# Patient Record
Sex: MALE | Race: WHITE
[De-identification: names, ages, dates, MRNs, and addresses within clinical notes are randomized per-mention and may not be internally consistent; named-entity substitution may affect disease eponyms.]

---

## 2018-03-01 ENCOUNTER — HOSPITAL ENCOUNTER (EMERGENCY)
Dept: HOSPITAL 63 - ER | Age: 63
Discharge: HOME | End: 2018-03-01
Payer: MEDICARE

## 2018-03-01 VITALS — DIASTOLIC BLOOD PRESSURE: 73 MMHG | SYSTOLIC BLOOD PRESSURE: 134 MMHG

## 2018-03-01 VITALS — BODY MASS INDEX: 17.9 KG/M2 | WEIGHT: 147 LBS | HEIGHT: 76 IN

## 2018-03-01 DIAGNOSIS — J18.1: Primary | ICD-10-CM

## 2018-03-01 DIAGNOSIS — Z91.19: ICD-10-CM

## 2018-03-01 DIAGNOSIS — Z99.81: ICD-10-CM

## 2018-03-01 DIAGNOSIS — J44.9: ICD-10-CM

## 2018-03-01 DIAGNOSIS — F17.210: ICD-10-CM

## 2018-03-01 DIAGNOSIS — I50.9: ICD-10-CM

## 2018-03-01 LAB
ALBUMIN SERPL-MCNC: 3.1 G/DL (ref 3.4–5)
ALBUMIN/GLOB SERPL: 0.9 {RATIO} (ref 1–1.7)
ALP SERPL-CCNC: 68 U/L (ref 46–116)
ALT SERPL-CCNC: 22 U/L (ref 16–63)
AMPHETAMINE/METHAMPHETAMINE: (no result)
ANION GAP SERPL CALC-SCNC: 3 MMOL/L (ref 6–14)
APTT PPP: YELLOW S
AST SERPL-CCNC: 12 U/L (ref 15–37)
BACTERIA #/AREA URNS HPF: 0 /HPF
BARBITURATES UR-MCNC: (no result) UG/ML
BASOPHILS # BLD AUTO: 0 X10^3/UL (ref 0–0.2)
BASOPHILS NFR BLD: 0 % (ref 0–3)
BENZODIAZ UR-MCNC: (no result) UG/L
BGAS PCO2: 43 MMHG (ref 35–46)
BGAS PH: 7.45 (ref 7.35–7.46)
BGAS PO2: 95 MMHG (ref 80–100)
BILIRUB SERPL-MCNC: 0.5 MG/DL (ref 0.2–1)
BILIRUB UR QL STRIP: (no result)
BUN/CREAT SERPL: 8 (ref 6–20)
CA-I SERPL ISE-MCNC: 6 MG/DL (ref 8–26)
CALCIUM SERPL-MCNC: 8.9 MG/DL (ref 8.5–10.1)
CANNABINOIDS UR-MCNC: (no result) UG/L
CHLORIDE SERPL-SCNC: 100 MMOL/L (ref 98–107)
CO2 SERPL-SCNC: 36 MMOL/L (ref 21–32)
COCAINE UR-MCNC: (no result) NG/ML
CREAT SERPL-MCNC: 0.8 MG/DL (ref 0.7–1.3)
DELTA BASE BGAS: 6 MMOL/L (ref 0–3)
EOSINOPHIL NFR BLD: 0.1 X10^3/UL (ref 0–0.7)
EOSINOPHIL NFR BLD: 3 % (ref 0–3)
ERYTHROCYTE [DISTWIDTH] IN BLOOD BY AUTOMATED COUNT: 13.6 % (ref 11.5–14.5)
FIBRINOGEN PPP-MCNC: CLEAR MG/DL
GFR SERPLBLD BASED ON 1.73 SQ M-ARVRAT: 97.3 ML/MIN
GLOBULIN SER-MCNC: 3.6 G/DL (ref 2.2–3.8)
GLUCOSE SERPL-MCNC: 111 MG/DL (ref 70–99)
GLUCOSE UR STRIP-MCNC: (no result) MG/DL
HCT VFR BLD CALC: 41.7 % (ref 39–53)
HGB BLD-MCNC: 14.1 G/DL (ref 13–17.5)
INFLUENZA A PATIENT: NEGATIVE
INFLUENZA B PATIENT: NEGATIVE
LIPASE: 44 U/L (ref 73–393)
LYMPHOCYTES # BLD: 1.2 X10^3/UL (ref 1–4.8)
LYMPHOCYTES NFR BLD AUTO: 27 % (ref 24–48)
MCH RBC QN AUTO: 32 PG (ref 25–35)
MCHC RBC AUTO-ENTMCNC: 34 G/DL (ref 31–37)
MCV RBC AUTO: 95 FL (ref 79–100)
METHADONE SERPL-MCNC: (no result) NG/ML
MONO #: 0.5 X10^3/UL (ref 0–1.1)
MONOCYTES NFR BLD: 11 % (ref 0–9)
NEUT #: 2.7 X10^3UL (ref 1.8–7.7)
NEUTROPHILS NFR BLD AUTO: 59 % (ref 31–73)
NITRITE UR QL STRIP: (no result)
O2 SAT BGAS: 98 % (ref 92–99)
O2/TOTAL GAS SETTING VFR VENT: 24 %
OPIATES UR-MCNC: (no result) NG/ML
PCP SERPL-MCNC: (no result) MG/DL
PLATELET # BLD AUTO: 315 X10^3/UL (ref 140–400)
POTASSIUM SERPL-SCNC: 4.2 MMOL/L (ref 3.5–5.1)
PROT SERPL-MCNC: 6.7 G/DL (ref 6.4–8.2)
RBC # BLD AUTO: 4.38 X10^6/UL (ref 4.3–5.7)
RBC #/AREA URNS HPF: 0 /HPF (ref 0–2)
SODIUM SERPL-SCNC: 139 MMOL/L (ref 136–145)
SP GR UR STRIP: 1.02
SQUAMOUS #/AREA URNS LPF: (no result) /LPF
UROBILINOGEN UR-MCNC: 1 MG/DL
WBC # BLD AUTO: 4.6 X10^3/UL (ref 4–11)
WBC #/AREA URNS HPF: (no result) /HPF (ref 0–4)

## 2018-03-01 PROCEDURE — 83605 ASSAY OF LACTIC ACID: CPT

## 2018-03-01 PROCEDURE — 87804 INFLUENZA ASSAY W/OPTIC: CPT

## 2018-03-01 PROCEDURE — 85025 COMPLETE CBC W/AUTO DIFF WBC: CPT

## 2018-03-01 PROCEDURE — 82803 BLOOD GASES ANY COMBINATION: CPT

## 2018-03-01 PROCEDURE — 80307 DRUG TEST PRSMV CHEM ANLYZR: CPT

## 2018-03-01 PROCEDURE — 84484 ASSAY OF TROPONIN QUANT: CPT

## 2018-03-01 PROCEDURE — 87040 BLOOD CULTURE FOR BACTERIA: CPT

## 2018-03-01 PROCEDURE — 36415 COLL VENOUS BLD VENIPUNCTURE: CPT

## 2018-03-01 PROCEDURE — 83690 ASSAY OF LIPASE: CPT

## 2018-03-01 PROCEDURE — 36600 WITHDRAWAL OF ARTERIAL BLOOD: CPT

## 2018-03-01 PROCEDURE — 81001 URINALYSIS AUTO W/SCOPE: CPT

## 2018-03-01 PROCEDURE — G0479 DRUG TEST PRESUMP NOT OPT: HCPCS

## 2018-03-01 PROCEDURE — 96365 THER/PROPH/DIAG IV INF INIT: CPT

## 2018-03-01 PROCEDURE — 93005 ELECTROCARDIOGRAM TRACING: CPT

## 2018-03-01 PROCEDURE — 99285 EMERGENCY DEPT VISIT HI MDM: CPT

## 2018-03-01 PROCEDURE — 94640 AIRWAY INHALATION TREATMENT: CPT

## 2018-03-01 PROCEDURE — 96375 TX/PRO/DX INJ NEW DRUG ADDON: CPT

## 2018-03-01 PROCEDURE — 80053 COMPREHEN METABOLIC PANEL: CPT

## 2018-03-01 PROCEDURE — 83880 ASSAY OF NATRIURETIC PEPTIDE: CPT

## 2018-03-01 PROCEDURE — 82550 ASSAY OF CK (CPK): CPT

## 2018-03-01 PROCEDURE — 71045 X-RAY EXAM CHEST 1 VIEW: CPT

## 2018-03-01 NOTE — PHYS DOC
General


Chief Complaint:  SHORTNESS OF BREATH


Stated Complaint:  SOA


Time Seen by MD:  20:22


Source:  patient


Exam Limitations:  no limitations


Problems:  





History of Present Illness


Initial Comments


62-year-old male arrives to the emergency department complaining of cough and 

shortness of breath.


Patient has chronic respiratory failure due to COPD he uses 2-3 L of O2 at home 

as a baseline. He states that his cough has been persistent he hasn't gotten 

any better since last visit, bringing up some mild yellow discharge. Vital 

signs are stable on arrival using home O2, no new or worsening symptoms no new 

chest pain or fevers.


Patient was seen here  with similar symptoms, he left his antibiotic 

prescription and the exam room upon discharge. I advised him that next time he 

leaves without his medications he can call the ED and we can call his 

prescription in.


Timing/Duration:  other


Severity:  moderate


Modifying Factors:  improves with medication, worse with movement, improves 

with rest


Associated Symptoms:  cough, malaise, shortness of breath


Allergies:  


Coded Allergies:  


     No Known Drug Allergies (Unverified , 18)





Past Medical History


Medical History:  other (chronic respiratory failure uses 2-3 L home O2 

continuously, CHF, COPD, tobaccoism, arrhythmia)


Surgical History:  other (recent heart catheterization for arrhythmia 

reportedly negative)





Social History


Smoker:  cigarettes


Alcohol:  none


Drugs:  none





Review of Systems


Constitutional:  denies chills, denies diaphoresis, denies fever, malaise


EENTM:  denies ear pain, denies nose pain, nose congestion, denies throat pain


Respiratory:  cough, shortness of breath, wheezing


Cardiovascular:  denies chest pain, denies palpitations, denies syncope


Gastrointestinal:  denies abdominal pain, denies nausea, denies vomiting


Musculoskeletal:  denies back pain, denies joint swelling, denies neck pain


Psychiatric/Neurological:  denies headache, denies numbness, denies paresthesia

, denies weakness


Hematologic/Lymphatic:  denies blood clots, denies easy bleeding, denies easy 

bruising





Physical Exam


General Appearance:  no apparent distress, thin


Ear, Nose, Throat:  hearing grossly normal, normal ENT inspection, normal 

pharynx


Neck:  non-tender, supple


Respiratory:  other (wheeze bilaterally with rales at the right base good air 

movement no respiratory distress beyond baseline)


Cardiovascular:  normal peripheral pulses, regular rate, rhythm


Gastrointestinal:  non tender, soft


Extremities:  normal range of motion, normal inspection, no pedal edema, no 

calf tenderness


Neurologic/Psychiatric:  CNs II-XII nml as tested, no motor/sensory deficits, 

alert, oriented x 3





Orders, Labs, Meds


EKG:  NSR 89 bpm, T inversion v2 PAC/PVC unchanged from prior.  Interp by me





AB.41/59/89/37/97%/28





AP chest: Persistent right lower lobe infiltrate unchanged from 225 study. 

Interpreted by me.





Labs reassuring CO2 36 urine positive for benzos





I discussed findings with the patient. He continues to appear stable as last ED 

visit. Received Rocephin and Zithromax intravenously as well as Solu-Medrol and 

a DuoNeb and symptoms have improved from ED arrival. No apparent emergent 

condition. I discussed prescription and over-the-counter medications, discussed 

signs and symptoms to monitor as well as indications for urgent return to the 

department. Discussed medication, compliance and the patient expressed 

agreement and understanding of treatment plan. He was advised to discontinue 

smoking.


Departure


Time of Disposition:  23:07


Disposition:  01 HOME, SELF-CARE


Diagnosis:  RLL Pneumonia, Noncompliance


Condition:  STABLE


Patient Instructions:  Pneumonia, Adult, Easy-to-Read





Additional Instructions:  


Please review the patient education materials given by ED staff.


Continue current medications including breathing treatments and O2.


Over-the-counter Tylenol as needed.


Prescription: Doxycycline, prednisone, guaifenesin with codeine syrup


Follow-up with your doctor on Monday for recheck.


Take medications as prescribed without any missed doses.


Return to ED as needed.











JERI AGARWAL DO Mar 1, 2018 20:44

## 2018-03-01 NOTE — EKG
[facility] [address], [place] [address]

Test Date:    2018               Test Time:    20:37:48

Pat Name:     SRAVANI BROWN                Department:   

Patient ID:   [id number]           Room:          

Gender:       M                        Technician:   

:          [id number]               Requested By: JERI AGARWAL

Order Number: [id number]            Reading MD:     

                                 Measurements

Intervals                              Axis          

Rate:         89                       P:            62

MA:           130                      QRS:          83

QRSD:         82                       T:            71

QT:           328                                    

QTc:          400                                    

                           Interpretive Statements

SINUS RHYTHM

ATRIAL PREMATURE COMPLEX(ES)

R-S TRANSITION ZONE IN V LEADS DISPLACED TO THE LEFT

S1,S2,S3 PATTERN

QRS(T) CONTOUR ABNORMALITY

CONSIDER ANTEROLATERAL MYOCARDIAL DAMAGE

POSSIBLY ABNORMAL ECG

RI6.01

No previous ECG available for comparison

## 2018-03-02 NOTE — RAD
Portable chest, 3/1/2018:



History: Shortness of breath



Comparison is made to a study from 2/25/2018.  The heart size is normal. 

There is emphysema with mild parenchymal scarring.  Right basilar infiltrate

has largely cleared.  No new pulmonary abnormality is seen.  There is no

evidence of pleural fluid.



IMPRESSION:

1.  Emphysema.

2.  Resolving mild right basilar infiltrate.

## 2018-03-29 ENCOUNTER — HOSPITAL ENCOUNTER (EMERGENCY)
Dept: HOSPITAL 63 - ER | Age: 63
LOS: 1 days | Discharge: HOME | End: 2018-03-30
Payer: MEDICARE

## 2018-03-29 VITALS — WEIGHT: 147 LBS | BODY MASS INDEX: 17.9 KG/M2 | HEIGHT: 76 IN

## 2018-03-29 DIAGNOSIS — F41.9: Primary | ICD-10-CM

## 2018-03-29 DIAGNOSIS — Z99.81: ICD-10-CM

## 2018-03-29 DIAGNOSIS — F17.210: ICD-10-CM

## 2018-03-29 DIAGNOSIS — J44.9: ICD-10-CM

## 2018-03-29 DIAGNOSIS — I50.9: ICD-10-CM

## 2018-03-29 DIAGNOSIS — I25.10: ICD-10-CM

## 2018-03-29 DIAGNOSIS — Z98.61: ICD-10-CM

## 2018-03-29 LAB
BASOPHILS # BLD AUTO: 0.1 X10^3/UL (ref 0–0.2)
BASOPHILS NFR BLD: 1 % (ref 0–3)
EOSINOPHIL NFR BLD: 0.1 X10^3/UL (ref 0–0.7)
EOSINOPHIL NFR BLD: 1 % (ref 0–3)
ERYTHROCYTE [DISTWIDTH] IN BLOOD BY AUTOMATED COUNT: 13.7 % (ref 11.5–14.5)
HCT VFR BLD CALC: 45.4 % (ref 39–53)
HGB BLD-MCNC: 15.3 G/DL (ref 13–17.5)
LYMPHOCYTES # BLD: 2.1 X10^3/UL (ref 1–4.8)
LYMPHOCYTES NFR BLD AUTO: 22 % (ref 24–48)
MCH RBC QN AUTO: 32 PG (ref 25–35)
MCHC RBC AUTO-ENTMCNC: 34 G/DL (ref 31–37)
MCV RBC AUTO: 96 FL (ref 79–100)
MONO #: 0.6 X10^3/UL (ref 0–1.1)
MONOCYTES NFR BLD: 7 % (ref 0–9)
NEUT #: 6.5 X10^3UL (ref 1.8–7.7)
NEUTROPHILS NFR BLD AUTO: 69 % (ref 31–73)
PLATELET # BLD AUTO: 265 X10^3/UL (ref 140–400)
RBC # BLD AUTO: 4.73 X10^6/UL (ref 4.3–5.7)
WBC # BLD AUTO: 9.4 X10^3/UL (ref 4–11)

## 2018-03-29 PROCEDURE — 87040 BLOOD CULTURE FOR BACTERIA: CPT

## 2018-03-29 PROCEDURE — 85025 COMPLETE CBC W/AUTO DIFF WBC: CPT

## 2018-03-29 PROCEDURE — 82553 CREATINE MB FRACTION: CPT

## 2018-03-29 PROCEDURE — 71046 X-RAY EXAM CHEST 2 VIEWS: CPT

## 2018-03-29 PROCEDURE — 80076 HEPATIC FUNCTION PANEL: CPT

## 2018-03-29 PROCEDURE — 93005 ELECTROCARDIOGRAM TRACING: CPT

## 2018-03-29 PROCEDURE — 36415 COLL VENOUS BLD VENIPUNCTURE: CPT

## 2018-03-29 PROCEDURE — 83690 ASSAY OF LIPASE: CPT

## 2018-03-29 PROCEDURE — 80048 BASIC METABOLIC PNL TOTAL CA: CPT

## 2018-03-29 PROCEDURE — 83605 ASSAY OF LACTIC ACID: CPT

## 2018-03-29 PROCEDURE — 85610 PROTHROMBIN TIME: CPT

## 2018-03-29 PROCEDURE — 85730 THROMBOPLASTIN TIME PARTIAL: CPT

## 2018-03-29 PROCEDURE — 85379 FIBRIN DEGRADATION QUANT: CPT

## 2018-03-29 PROCEDURE — 99285 EMERGENCY DEPT VISIT HI MDM: CPT

## 2018-03-29 PROCEDURE — 84443 ASSAY THYROID STIM HORMONE: CPT

## 2018-03-29 PROCEDURE — 83735 ASSAY OF MAGNESIUM: CPT

## 2018-03-29 PROCEDURE — 83880 ASSAY OF NATRIURETIC PEPTIDE: CPT

## 2018-03-29 PROCEDURE — 84484 ASSAY OF TROPONIN QUANT: CPT

## 2018-03-29 NOTE — ED.ADGEN
Past History


Past Medical History:  Arrhythmia, CAD, CHF, COPD


Past Surgical History:  Angioplasty, Other


Smoking:  Cigarettes


Alcohol Use:  None


Drug Use:  None





Adult General


Chief Complaint


Chief Complaint


". I got my COPD acting up.. and I need some anxiety meds... "





HPI


HPI





Patient is a 63 year old male who presents with above hx and complaints of 

dyspnea, wheezing, coughing and increase in his anxiety. Patient has had some 

discolored sputum production. Patient has O2 dependent at 2 L. Pt. has a 

chronic history of coronary artery disease, CHF, COPD/emphysema, PTSD, and 

anxiety disorder.   Patient does continue to smoke approximately 2 packs per 

day. Patient denies illicit drug use. Patient denies any travel or 

immunosuppression. [facility]





Review of Systems


Review of Systems





Constitutional: Denies fever or chills []


Eyes: Denies change in visual acuity, redness, or eye pain []


HENT: Complaints of nasal congestion 


Respiratory: Complaints of cough and wheezing


Cardiovascular: No additional information not addressed in HPI []


GI: Denies abdominal pain, nausea, vomiting, bloody stools or diarrhea []


: Denies dysuria or hematuria []


Musculoskeletal: Veins of chronic back pain and joint pain []


Integument: Denies rash or skin lesions []


Neurologic: Denies headache, focal weakness or sensory changes []


Endocrine: Denies polyuria or polydipsia []





All other systems were reviewed and found to be within normal limits, except as 

documented in this note.





Family History


Family History


Noncontributory to presentation





Current Medications


Current Medications





Current Medications








 Medications


  (Trade)  Dose


 Ordered  Sig/John  Start Time


 Stop Time Status Last Admin


Dose Admin


 


 Albuterol Sulfate


  (Ventolin Hfa)  1 puff  1X  ONCE  3/30/18 01:00


 3/30/18 01:01 DC  


 


 


 Albuterol/


 Ipratropium


  (Duoneb)  3 ml  1X  ONCE  3/29/18 23:45


 3/29/18 23:46 DC  


 


 


 Aspirin


  (Dottie Aspirin)  325 mg  1X  ONCE  3/29/18 23:00


 3/29/18 23:01 DC 3/29/18 23:00


325 MG


 


 Azithromycin


  (Zithromax)  500 mg  1X  ONCE  3/29/18 23:45


 3/29/18 23:46 DC 3/29/18 23:42


500 MG


 


 Ceftriaxone


 Sodium 1 gm/


 Sodium Chloride  50 ml @ 


 100 mls/hr  1X  ONCE  3/29/18 23:45


 3/30/18 00:14 DC  


 


 


 Lorazepam


  (Ativan)  1 mg  1X  ONCE  3/29/18 23:45


 3/29/18 23:46 DC 3/29/18 23:41


1 MG


 


 Methylprednisolone


 Sodium Succinate


  (SOLU-Medrol


 125MG VIAL)  125 mg  1X  ONCE  3/29/18 23:45


 3/29/18 23:46 DC  


 


 


 Sodium Chloride  1,000 ml @ 


 100 mls/hr  Q10H  3/29/18 23:00


 3/30/18 01:17 DC  


 





See nursing for home medications





Allergies


Allergies





Allergies








Coded Allergies Type Severity Reaction Last Updated Verified


 


  No Known Drug Allergies    2/2/18 No











Physical Exam


Physical Exam





Constitutional: Moderately acute distress, non-toxic appearance. []


HENT: Normocephalic, atraumatic, bilateral external ears normal, oropharynx 

moist, injected pharynx, no oral exudates, nose rhinorrhea


Eyes: PERRLA, EOMI, conjunctiva normal, no discharge. [] 


Neck: Normal range of motion, no tenderness, supple, no stridor. [] 


Cardiovascular: Tachycardia Heart rate regular rhythm, no murmur []


Lungs & Thorax:  Bilateral breath sounds equal at apexes with scattered wheezes 

throughout on auscultation []


Abdomen: Bowel sounds normal, soft, no tenderness, no masses, no pulsatile 

masses. [] 


Skin: Warm, dry, no erythema, no rash. [] 


Back: No tenderness, no CVA tenderness. [] 


Extremities: No tenderness, no cyanosis, no clubbing, ROM intact, no edema. [] 


Neurologic: Alert and oriented X 3, normal motor function, normal sensory 

function, no focal deficits noted. []


Psychologic: Affect very anxious, angry, judgement poor insight, mood normal. []





Current Patient Data


Vital Signs





 Vital Signs








  Date Time  Temp Pulse Resp B/P (MAP) Pulse Ox O2 Delivery O2 Flow Rate FiO2


 


3/29/18 22:24 98.1 93 28  99 Room Air  








Lab Results





 Laboratory Tests








Test


  3/29/18


23:15 3/29/18


23:55


 


White Blood Count


  9.4 x10^3/uL


(4.0-11.0) 


 


 


Red Blood Count


  4.73 x10^6/uL


(4.30-5.70) 


 


 


Hemoglobin


  15.3 g/dL


(13.0-17.5) 


 


 


Hematocrit


  45.4 %


(39.0-53.0) 


 


 


Mean Corpuscular Volume


  96 fL ()


  


 


 


Mean Corpuscular Hemoglobin 32 pg (25-35)   


 


Mean Corpuscular Hemoglobin


Concent 34 g/dL


(31-37) 


 


 


Red Cell Distribution Width


  13.7 %


(11.5-14.5) 


 


 


Platelet Count


  265 x10^3/uL


(140-400) 


 


 


Neutrophils (%) (Auto) 69 % (31-73)   


 


Lymphocytes (%) (Auto) 22 % (24-48)  L 


 


Monocytes (%) (Auto) 7 % (0-9)   


 


Eosinophils (%) (Auto) 1 % (0-3)   


 


Basophils (%) (Auto) 1 % (0-3)   


 


Neutrophils # (Auto)


  6.5 x10^3uL


(1.8-7.7) 


 


 


Lymphocytes # (Auto)


  2.1 x10^3/uL


(1.0-4.8) 


 


 


Monocytes # (Auto)


  0.6 x10^3/uL


(0.0-1.1) 


 


 


Eosinophils # (Auto)


  0.1 x10^3/uL


(0.0-0.7) 


 


 


Basophils # (Auto)


  0.1 x10^3/uL


(0.0-0.2) 


 


 


Prothrombin Time


  10.1 SEC


(9.4-11.4) 


 


 


Prothrombin Time INR 1.0 (0.9-1.1)   


 


PTT


  23 SEC (23-33)


  


 


 


D-Dimer ([place])


  0.34 mg/L


(0.00-0.50) 


 


 


Sodium Level


  141 mmol/L


(136-145) 


 


 


Potassium Level


  3.8 mmol/L


(3.5-5.1) 


 


 


Chloride Level


  104 mmol/L


() 


 


 


Carbon Dioxide Level


  32 mmol/L


(21-32) 


 


 


Anion Gap 5 (6-14)  L 


 


Blood Urea Nitrogen


  8 mg/dL (8-26)


  


 


 


Creatinine


  0.7 mg/dL


(0.7-1.3) 


 


 


Estimated GFR


(Cockcroft-Gault) 113.2  


  


 


 


Glucose Level


  86 mg/dL


(70-99) 


 


 


Calcium Level


  9.2 mg/dL


(8.5-10.1) 


 


 


Magnesium Level


  2.1 mg/dL


(1.8-2.4) 


 


 


Total Bilirubin


  0.3 mg/dL


(0.2-1.0) 


 


 


Direct Bilirubin


  0.1 mg/dL


(0.0-0.2) 


 


 


Aspartate Amino Transferase


(AST) 14 U/L (15-37)


L 


 


 


Alanine Aminotransferase (ALT)


  21 U/L (16-63)


  


 


 


Alkaline Phosphatase


  63 U/L


() 


 


 


Creatine Kinase


  28 U/L


()  L 


 


 


Creatine Kinase MB (Mass)


  0.8 ng/mL


(0.0-3.6) 


 


 


Creatine Kinase MB Relative


Index 2.9 % (0-4)  


  


 


 


Troponin I Quantitative


  < 0.017 ng/mL


(0-0.055) 


 


 


NT-Pro-B-Type Natriuretic


Peptide 79 pg/mL


(0-124) 


 


 


Total Protein


  7.0 g/dL


(6.4-8.2) 


 


 


Albumin


  3.6 g/dL


(3.4-5.0) 


 


 


Lipase


  123 U/L


() 


 


 


Lactic Acid Level


  


  0.9 mmol/L


(0.4-2.0)











EKG


EKG


My interpretation of EKG shows sinus rhythm at 94 bpm. He does have occasional 

premature atrial complexes[], by multiple P waves.  Right bundle branch block. 

No findings acute STEMI with contralateral changes





Radiology/Procedures


Radiology/Procedures


My interpretation of chest x-ray shows marked COPD/emphysema type pattern of 

hyper expansion and flattened diaphragm.  No large infiltrate or consolidation.





Course & Med Decision Making


Course & Med Decision Making


Pertinent Labs and Imaging studies reviewed. (See chart for details).  Patient 

refusing admission currently for COPD exacerbation.  Patient states he only 

needs anxiety meds.  Patient  to use Ventolin 2 puffs 4 times a day. Prednisone 

50 mg a day. Take his Zithromax 250 mg daily for 5 days.  [facility]  Return if he elects to be admitted. Patient strongly encouraged to 

stop smoking. Patient to follow-up with counseling [facility] for filling 

anxiety meds or Ativan prescription. Patient refused to give urine sample for 

evaluation.





[]





Final Impression


Final Impression


1. COPD/emphysema exacerbation


2. Anxiety disorder[]


3. Tobacco abuse


Problems:  





Dragon Disclaimer


Dragon Disclaimer


This electronic medical record was generated, in whole or in part, using a 

voice recognition dictation system.











MARK SUÁREZ MD Mar 29, 2018 22:36

## 2018-03-30 VITALS — SYSTOLIC BLOOD PRESSURE: 138 MMHG | DIASTOLIC BLOOD PRESSURE: 84 MMHG

## 2018-03-30 LAB
ALBUMIN SERPL-MCNC: 3.6 G/DL (ref 3.4–5)
ALP SERPL-CCNC: 63 U/L (ref 46–116)
ALT SERPL-CCNC: 21 U/L (ref 16–63)
ANION GAP SERPL CALC-SCNC: 5 MMOL/L (ref 6–14)
AST SERPL-CCNC: 14 U/L (ref 15–37)
BILIRUB DIRECT SERPL-MCNC: 0.1 MG/DL (ref 0–0.2)
BILIRUB SERPL-MCNC: 0.3 MG/DL (ref 0.2–1)
CA-I SERPL ISE-MCNC: 8 MG/DL (ref 8–26)
CALCIUM SERPL-MCNC: 9.2 MG/DL (ref 8.5–10.1)
CHLORIDE SERPL-SCNC: 104 MMOL/L (ref 98–107)
CO2 SERPL-SCNC: 32 MMOL/L (ref 21–32)
CREAT SERPL-MCNC: 0.7 MG/DL (ref 0.7–1.3)
GFR SERPLBLD BASED ON 1.73 SQ M-ARVRAT: 113.2 ML/MIN
GLUCOSE SERPL-MCNC: 86 MG/DL (ref 70–99)
LIPASE: 123 U/L (ref 73–393)
MAGNESIUM SERPL-MCNC: 2.1 MG/DL (ref 1.8–2.4)
POTASSIUM SERPL-SCNC: 3.8 MMOL/L (ref 3.5–5.1)
PROT SERPL-MCNC: 7 G/DL (ref 6.4–8.2)
SODIUM SERPL-SCNC: 141 MMOL/L (ref 136–145)

## 2018-03-30 NOTE — RAD
Indication: Dyspnea, COPD



Technique: PA and lateral views of the chest



Comparison: Previous study from 3/1/2018



Findings:

Heart is normal in size.  Lungs are hyperinflated with flattening of

diaphragms.  No focal consolidation.  No pneumothorax or pleural effusion. 

Utilized bony thorax within normal limits.



Impression: No acute cardiopulmonary process.  Findings of COPD.

## 2018-03-30 NOTE — EKG
[facility] [address], [place] [address]

Test Date:    2018               Test Time:    23:05:00

Pat Name:     SRAVANI BROWN                Department:   

Patient ID:   [id number]           Room:          

Gender:       M                        Technician:   

:          1955               Requested By: MARK SUÁREZ

Order Number: [id number]            Reading MD:     

                                 Measurements

Intervals                              Axis          

Rate:         94                       P:            90

UT:           148                      QRS:          74

QRSD:         94                       T:            74

QT:           330                                    

QTc:          413                                    

                           Interpretive Statements

SINUS RHYTHM

ATRIAL PREMATURE COMPLEX(ES)

BIATRIAL ENLARGEMENT

R-S TRANSITION ZONE IN V LEADS DISPLACED TO THE LEFT

INCOMPLETE RIGHT BUNDLE BRANCH BLOCK

ABNORMAL ECG

RI6.01

No previous ECG available for comparison

## 2018-04-02 ENCOUNTER — HOSPITAL ENCOUNTER (EMERGENCY)
Dept: HOSPITAL 63 - ER | Age: 63
Discharge: LEFT BEFORE DISCHARGE | End: 2018-04-02
Payer: MEDICARE

## 2018-04-02 VITALS — BODY MASS INDEX: 17.66 KG/M2 | HEIGHT: 76 IN | WEIGHT: 145.06 LBS

## 2018-04-02 VITALS — DIASTOLIC BLOOD PRESSURE: 82 MMHG | SYSTOLIC BLOOD PRESSURE: 147 MMHG

## 2018-04-02 DIAGNOSIS — J44.9: ICD-10-CM

## 2018-04-02 DIAGNOSIS — F17.210: ICD-10-CM

## 2018-04-02 DIAGNOSIS — F41.9: Primary | ICD-10-CM

## 2018-04-02 DIAGNOSIS — I25.10: ICD-10-CM

## 2018-04-02 DIAGNOSIS — E86.0: ICD-10-CM

## 2018-04-02 DIAGNOSIS — Z98.61: ICD-10-CM

## 2018-04-02 DIAGNOSIS — I50.9: ICD-10-CM

## 2018-04-02 PROCEDURE — 71046 X-RAY EXAM CHEST 2 VIEWS: CPT

## 2018-04-02 PROCEDURE — 99284 EMERGENCY DEPT VISIT MOD MDM: CPT

## 2018-04-02 PROCEDURE — 93005 ELECTROCARDIOGRAM TRACING: CPT

## 2018-04-02 NOTE — RAD
Chest, 2 views, 4/2/2018:



History: Shortness of breath



Comparison is made to a study from 3/29/2018.  The heart size is normal.  The

lungs are hyperexpanded compatible with emphysema.  There are mild parenchymal

scars.  No acute infiltrate is seen.  There is no evidence of pleural fluid or

pneumothorax.



IMPRESSION: No acute cardiopulmonary abnormality is detected.

## 2018-04-02 NOTE — PHYS DOC
Past History


Past Medical History:  Anxiety, Arrhythmia, CAD, CHF, COPD


Past Surgical History:  Angioplasty, Other


Smoking:  Cigarettes


Alcohol Use:  None


Drug Use:  None





Adult General


Chief Complaint


Chief Complaint:  DYSPNEA/RESPIRATOY DISTRESS





HPI


HPI





Patient is a 63-year-old male with a history of COPD who presents with 

complaints of anxiety. Patient is a smoker and continues to smoke despite 

severe COPD. Patient is refusing all treatment in the ED the only thing that he 

wants is anxiety medication. Patient had already been form that he needs to 

find a PCP or go back to the one he had before and have him give him anxiety 

medications apparently the patient has no follow-up yet as he was told he 

should do.





Review of Systems


Review of Systems





Constitutional: Denies fever or chills []


Eyes: Denies change in visual acuity, redness, or eye pain []


HENT: Denies nasal congestion or sore throat []


Respiratory: Shortness of breath that is baseline for patient


Cardiovascular: No chest pain


GI: Denies abdominal pain, nausea, vomiting


: Denies dysuria or hematuria []


Musculoskeletal: Denies back pain or joint pain []


Integument: Denies rash or skin lesions []


Neurologic: Denies headache, focal weakness or sensory changes []


Psych: Anxious





All other systems were reviewed and found to be within normal limits, except as 

documented in this note.





Allergies


Allergies





Allergies








Coded Allergies Type Severity Reaction Last Updated Verified


 


  No Known Drug Allergies    2/2/18 No











Physical Exam


Physical Exam





Constitutional: Well developed, well nourished, no acute distress, non-toxic 

appearance. Cachectic


HENT: Normocephalic, atraumatic, bilateral external ears normal, oropharynx dry

, no oral exudates, nose normal. []


Eyes:EOMI, conjunctiva normal, no discharge. [] 


Neck: Normal range of motion, no tenderness, supple, no stridor. No JVD, no 

meningeal signs


Cardiovascular: Tachycardia, equal pulses, normal perfusion


Lungs & Thorax: Mild expiratory wheezing at both lung bases, mild tachypnea


Abdomen: Bowel sounds normal, soft, no tenderness, 


Skin: Warm, dry, no erythema, no rash. [] 


Back: No tenderness, no CVA tenderness. Normal range of motion


Extremities: No tenderness, no DVT, ROM intact, no edema. [] 


Neurologic: Alert and oriented X 3, normal motor function, , no focal deficits 

noted. []


Psychologic: Mildly anxious, no signs of psychosis, patient displays normal 

mentation and medical decision capacity





EKG


EKG


0027 102 tachycardia, no STEMI[]





Radiology/Procedures


Radiology/Procedures


[]





Course & Med Decision Making


Course & Med Decision Making


Pertinent Labs and Imaging studies reviewed. (See chart for details)





Tobacco abuse counseling has been provided, total time 3 minutes. Risk and 

benefits have been discussed with the patient which include but are not limited 

to improvement of symptoms, decrease of COPD exacerbations, decrease of cancer. 

Patient understands the need to quit and states she will try to do so.





I have offered the patient treatment as well as hydration and check of his 

blood work but the patient refuses any treatment or interventions during this 

ED visit and states that the only thing he needs at this time is anxiety 

medication. He states he has inhalers and other medications at home. I had long 

discussion with the patient and make clear to him that is important for him to 

get this type of medications for one single provider as they can be dangerous 

and the response to the medications needs to be monitored by a single provider 

who will be able to see the patient regularly. The patient understands and 

agrees to follow-up as directed.





I'm very very clear to the patient today that we'll give him anxiety medication 

this one time only but I will not continue to do this again





The patient is displaying medical decision capacity, he repeats back to me his 

intentions as well as the reasons why he refuses medical treatment which 

included the fact that he has medications at home for his COPD. We honor the 

patient's autonomy and we will comply with his request for no intervention. 

Patient will leave the emergency department AGAINST MEDICAL ADVICE ROM at risk 

and benefits including death and permanent disability having explained.





[]





Dragon Disclaimer


Dragon Disclaimer


This electronic medical record was generated, in whole or in part, using a 

voice recognition dictation system.





Departure


Departure:


Impression:  


 Primary Impression:  


 Anxiety


 Additional Impressions:  


 COPD (chronic obstructive pulmonary disease)


 Tobacco abuse


 Tobacco abuse counseling


 Dehydration


Disposition:  07 AGAINST MEDICAL ADVICE


Condition:  STABLE


Referrals:  


PCP,NO (PCP)


Patient Instructions:  Anxiety and Panic Attacks, Chronic Obstructive Pulmonary 

Disease, Dehydration, Adult, Discharge Against Medical Advice, Smoking Cessation





Problem Qualifiers











HENRI CLAY MD Apr 2, 2018 00:38